# Patient Record
Sex: MALE | Race: BLACK OR AFRICAN AMERICAN | Employment: FULL TIME | URBAN - METROPOLITAN AREA
[De-identification: names, ages, dates, MRNs, and addresses within clinical notes are randomized per-mention and may not be internally consistent; named-entity substitution may affect disease eponyms.]

---

## 2017-08-17 ENCOUNTER — HOSPITAL ENCOUNTER (OUTPATIENT)
Dept: PHYSICAL THERAPY | Age: 57
Discharge: HOME | End: 2017-08-17
Payer: COMMERCIAL

## 2017-08-17 PROCEDURE — 97161 PT EVAL LOW COMPLEX 20 MIN: CPT

## 2017-08-17 PROCEDURE — 97110 THERAPEUTIC EXERCISES: CPT

## 2017-08-17 NOTE — PROGRESS NOTES
[facility]  [address], [address] [phone] fax    Plan of Care/ Statement of Necessity for Physical Therapy Services           Patient name: [facility] Start of Care: 2017   Referral source: Shaila Renee* : 1960    Medical Diagnosis: Left shoulder pain [M25.512]  Left arm pain [M79.602]   Onset Date:17    Treatment Diagnosis: s/p L distal biceps tendon repair   Prior Hospitalization: see medical history Provider#: [id number]   Medications: Verified on Patient summary List    Comorbidities: none per patient    Prior Level of Function: Works as [employer] Officer (). Plans to return to work next week. Functionally independent and active, enjoys working out frequently    The Plan of Care and following information is based on the information from the initial evaluation. Assessment/ key information: Patient is a 61 y. o.male presenting with Left shoulder pain [M25.512]  Left arm pain [M79.602]. Mr. Gayatri Abreu presents to initial PT evaluation ~ 7 weeks s/p distal biceps tendon repair (DOS:17) after injuring it falling off his deck and hitting it on the edge of his pool. He arrives to his visit today without brace in place, reporting he has not needed to use it. Advised him that per protocol he should continue to wear the brace, at least until MD clears him to discontinue use. He demonstrates full passive elbow extension, but is limited with end range elbow flexion and pronation. Pain is well controlled, although he does report some lateral epicondyle pain when keeping the elbow in a flexed position or with wrist extension activities. He is doing very well and is motivated to return to his active lifestyle and workout regimen. I feel he will need some cuing to slow down with daily activities for this reason to prevent reinjury to surgical site.   Patient will benefit from skilled PT services to address deficits and facilitate return to premorbid activity level and promote improved quality of life. Evaluation Complexity History LOW Complexity : Zero comorbidities / personal factors that will impact the outcome / POC; Examination LOW Complexity : 1-2 Standardized tests and measures addressing body structure, function, activity limitation and / or participation in recreation  ;Presentation MEDIUM Complexity : Evolving with changing characteristics  ; Clinical Decision Making MEDIUM Complexity : FOTO score of 26-74  Overall Complexity Rating: LOW   Problem List: pain affecting function, decrease ROM, decrease strength, edema affecting function, impaired gait/ balance, decrease ADL/ functional abilitiies, decrease activity tolerance, decrease flexibility/ joint mobility and decrease transfer abilities   Treatment Plan may include any combination of the following: Therapeutic exercise, Therapeutic activities, Neuromuscular re-education, Physical agent/modality, Gait/balance training, Manual therapy, Aquatic therapy, Patient education, Self Care training, Functional mobility training, Home safety training and Stair training  Patient / Family readiness to learn indicated by: asking questions, trying to perform skills and interest  Persons(s) to be included in education: patient (P)  Barriers to Learning/Limitations: None  Patient Goal (s): strength.   Patient Self Reported Health Status: excellent  Rehabilitation Potential: excellent  Short Term Goals: To be accomplished in 1 weeks:  1. Establish HEP for ROM & Strengthening. Long Term Goals: To be accomplished in 4 weeks:  1. Patient will be independent with HEP for ROM & Strengthening. Eval Status: na  2. Pt will increase L elbow ROM to 0-140 degrees to increase ease with ADLs. Eval Status:(0-120 deg)  4. Pt will increase FOTO score to 78 points to demonstrate improved functional lift & carry. Eval Status:FOTO: 64  5.  Pt will increase L elbow flexion/extension strength to grossly 5/5 in order to improve functional lift & carry. Eval Status:flexion: will address as cleared per protocol (9 weeks post op)    Frequency / Duration: Patient to be seen 2 times per week for 4 weeks. Patient/ Caregiver education and instruction: Diagnosis, prognosis, self care, activity modification and exercises   [x]  Plan of care has been reviewed with AURA Lechuga, PT 8/17/2017 8:42 AM    ________________________________________________________________________    I certify that the above Therapy Services are being furnished while the patient is under my care. I agree with the treatment plan and certify that this therapy is necessary.     Physician's Signature:____________________  Date:____________Time: _________    Please sign and return to [facility]  [address] [place], [address] [phone] fax

## 2017-08-17 NOTE — PROGRESS NOTES
PT DAILY TREATMENT NOTE     Patient Name: Chely Credit  Date:2017  : 1960  [x]  Patient  Verified  Payor: Bettina Machado / Plan: [place] [employer]  CAPITATED PT / Product Type: Commerical /    In time:805  Out time:835  Total Treatment Time (min): 30  Visit #: 1 of 8    Treatment Area: Left shoulder pain [M25.512]  Left arm pain [M79.602]    SUBJECTIVE  Pain Level (0-10 scale): 0  Any medication changes, allergies to medications, adverse drug reactions, diagnosis change, or new procedure performed?: [x] No    [] Yes (see summary sheet for update)  Subjective functional status/changes:   [x] See Eval form in paper chart     OBJECTIVE      22 min [x]Eval                  []Re-Eval         8 min Therapeutic Exercise:  [x] See flow sheet :HEP   Rationale: increase ROM, increase strength, improve coordination, improve balance and increase proprioception to improve the patients ability to perform ADLs. With   [] TE   [] TA   [] neuro   [] other: Patient Education: [x] Review HEP    [] Progressed/Changed HEP based on:   [] positioning   [] body mechanics   [] transfers   [] heat/ice application    [] other:           Pain Level (0-10 scale) post treatment: 0    ASSESSMENT:   [x]  See Evaluation          Goals:  Short Term Goals: To be accomplished in 1 weeks:  1. Establish HEP for ROM & Strengthening. Long Term Goals: To be accomplished in 4 weeks:  1. Patient will be independent with HEP for ROM & Strengthening. Eval Status: na  2. Pt will increase L elbow ROM to 0-140 degrees to increase ease with ADLs. Eval Status:(0-120 deg)  4. Pt will increase FOTO score to 78 points to demonstrate improved functional lift & carry. Eval Status:FOTO: 64  5. Pt will increase L elbow flexion/extension strength to grossly 5/5 in order to improve functional lift & carry.    Eval Status:flexion: will address as cleared per protocol (9 weeks post op)    PLAN      [x]  Continue plan of care           [facility] Freddy Rodriguez, PT 8/17/2017  8:52 AM

## 2017-08-18 ENCOUNTER — HOSPITAL ENCOUNTER (OUTPATIENT)
Dept: PHYSICAL THERAPY | Age: 57
Discharge: HOME | End: 2017-08-18
Payer: COMMERCIAL

## 2017-08-18 PROCEDURE — 97110 THERAPEUTIC EXERCISES: CPT

## 2017-08-18 NOTE — PROGRESS NOTES
PT DAILY TREATMENT NOTE     Patient Name: Mayra Evans  Date:2017  : 1960  [x]  Patient  Verified  Payor: Robyn Lopez / Plan: [place] [employer]  CAPITATED PT / Product Type: Commerical /    In time:835  Out time:915  Total Treatment Time (min): 40  Visit #: 2 of 8    Treatment Area: Left shoulder pain [M25.512]  Left arm pain [M79.602]    SUBJECTIVE  Pain Level (0-10 scale): 0  Any medication changes, allergies to medications, adverse drug reactions, diagnosis change, or new procedure performed?: [x] No    [] Yes (see summary sheet for update)  Subjective functional status/changes:   [] No changes reported  \"I'm doing great. \"    OBJECTIVE    Modality rationale: Patient declined   Min Type Additional Details    [] Estim:  []Unatt       []IFC  []Premod                        []Other:  []w/ice   []w/heat  Position:  Location:    [] Estim: []Att    []TENS instruct  []NMES                    []Other:  []w/US   []w/ice   []w/heat  Position:  Location:    []  Traction: [] Cervical       []Lumbar                       [] Prone          []Supine                       []Intermittent   []Continuous Lbs:  [] before manual  [] after manual    []  Ultrasound: []Continuous   [] Pulsed                           []1MHz   []3MHz W/cm2:  Location:    []  Iontophoresis with dexamethasone         Location: [] Take home patch   [] In clinic    []  Ice     []  heat  []  Ice massage  []  Laser   []  Anodyne Position:  Location:    []  Laser with stim  []  Other:  Position:  Location:    []  Vasopneumatic Device Pressure:       [] lo [] med [] hi   Temperature: [] lo [] med [] hi   [] Skin assessment post-treatment:  []intact []redness- no adverse reaction    []redness - adverse reaction:       40 min Therapeutic Exercise:  [x] See flow sheet :   Rationale: increase ROM, increase strength and improve coordination to improve the patients ability to perform ADLs.          With   [] TE   [] TA   [] neuro   [] other: Patient Education: [x] Review HEP    [] Progressed/Changed HEP based on:   [] positioning   [] body mechanics   [] transfers   [] heat/ice application    [] other:      Other Objective/Functional Measures: 0-138 deg AAROM L elbow     Pain Level (0-10 scale) post treatment: 0    ASSESSMENT/Changes in Function: Mr. Kiara Cedillo did well with initiation of exercises today. He did have some discomfort with web finger extension so held off. Patient will continue to benefit from skilled PT services to modify and progress therapeutic interventions, address functional mobility deficits, address ROM deficits, address strength deficits, analyze and address soft tissue restrictions, analyze and cue movement patterns, analyze and modify body mechanics/ergonomics, assess and modify postural abnormalities, address imbalance/dizziness and instruct in home and community integration to attain remaining goals. []  See Plan of Care  []  See progress note/recertification  []  See Discharge Summary         Progress towards goals / Updated goals:  Short Term Goals: To be accomplished in 1 weeks:  1. Establish HEP for ROM & Strengthening. issued     Long Term Goals: To be accomplished in 4 weeks:  1. Patient will be independent with HEP for ROM & Strengthening. Eval Status: na  2. Pt will increase L elbow ROM to 0-140 degrees to increase ease with ADLs. Eval Status:(0-120 deg)    0-138 deg with AAROM  4. Pt will increase FOTO score to 78 points to demonstrate improved functional lift & carry. Eval Status:FOTO: 64  5. Pt will increase L elbow flexion/extension strength to grossly 5/5 in order to improve functional lift & carry.                         Eval Status:flexion: will address as cleared per protocol (9 weeks post op)       PLAN  []  Upgrade activities as tolerated     [x]  Continue plan of care  []  Update interventions per flow sheet       []  Discharge due to:_  [] Other:_      Maldonado Landa, PT 8/18/2017  10:02 AM    Future Appointments  Date Time Provider Caroline Johnson   8/21/2017 11:00 AM Milbert Payment, PTA [facility]   8/24/2017 8:00 AM Milbert Payment, PTA [facility]   8/28/2017 10:00 AM Milbert Payment, PTA [facility]   8/30/2017 8:30 AM Milbert Payment, PTA [facility]   9/6/2017 8:00 AM [facility]   9/7/2017 8:30 AM Milbert Payment, PTA Demetrio Esparza

## 2017-08-21 ENCOUNTER — HOSPITAL ENCOUNTER (OUTPATIENT)
Dept: PHYSICAL THERAPY | Age: 57
Discharge: HOME | End: 2017-08-21
Payer: COMMERCIAL

## 2017-08-21 PROCEDURE — 97110 THERAPEUTIC EXERCISES: CPT

## 2017-08-21 NOTE — PROGRESS NOTES
PT DAILY TREATMENT NOTE     Patient Name: Zachary Huang  Date:2017  : 1960  [x]  Patient  Verified  Payor: Mimi Flowers / Plan: [employer]  CAPITATED PT / Product Type: Commerical /    In time:1100  Out time:1133  Total Treatment Time (min): 33  Visit #: 3 of 8    Treatment Area: Left shoulder pain [M25.512]  Left arm pain [M79.602]    SUBJECTIVE  Pain Level (0-10 scale): 0  Any medication changes, allergies to medications, adverse drug reactions, diagnosis change, or new procedure performed?: [x] No    [] Yes (see summary sheet for update)  Subjective functional status/changes:   [] No changes reported  \"No pain at all. \"    OBJECTIVE    Modality rationale: patient declined   Min Type Additional Details    [] Estim:  []Unatt       []IFC  []Premod                        []Other:  []w/ice   []w/heat  Position:  Location:    [] Estim: []Att    []TENS instruct  []NMES                    []Other:  []w/US   []w/ice   []w/heat  Position:  Location:    []  Traction: [] Cervical       []Lumbar                       [] Prone          []Supine                       []Intermittent   []Continuous Lbs:  [] before manual  [] after manual    []  Ultrasound: []Continuous   [] Pulsed                           []1MHz   []3MHz W/cm2:  Location:    []  Iontophoresis with dexamethasone         Location: [] Take home patch   [] In clinic    []  Ice     []  heat  []  Ice massage  []  Laser   []  Anodyne Position:  Location:    []  Laser with stim  []  Other:  Position:  Location:    []  Vasopneumatic Device Pressure:       [] lo [] med [] hi   Temperature: [] lo [] med [] hi   [] Skin assessment post-treatment:  []intact []redness- no adverse reaction    []redness - adverse reaction:     33 min Therapeutic Exercise:  [x] See flow sheet :   Rationale: increase ROM, increase strength, improve coordination, improve balance and increase proprioception to improve the patients ability to improve mobility and ADL performance With   [x] TE   [] TA   [] neuro   [] other: Patient Education: [x] Review HEP    [] Progressed/Changed HEP based on:   [x] positioning   [x] body mechanics   [] transfers   [] heat/ice application    [] other:      Other Objective/Functional Measures:      Pain Level (0-10 scale) post treatment: 0    ASSESSMENT/Changes in Function: Pt is progressing well, but needs some cuing to not over do things around the clinic. Limited and a little discomfort with pronation. Patient will continue to benefit from skilled PT services to modify and progress therapeutic interventions, address functional mobility deficits, address ROM deficits, address strength deficits, analyze and address soft tissue restrictions, analyze and cue movement patterns, analyze and modify body mechanics/ergonomics, assess and modify postural abnormalities, address imbalance/dizziness and instruct in home and community integration to attain remaining goals. [x]  See Plan of Care  []  See progress note/recertification  []  See Discharge Summary         Progress towards goals / Updated goals:  Short Term Goals: To be accomplished in 1 weeks:  1. Establish HEP for ROM & Strengthening. MET      Long Term Goals: To be accomplished in 4 weeks:  1. Patient will be independent with HEP for ROM & Strengthening. Eval Status: na   Reports compliance  2. Pt will increase L elbow ROM to 0-140 degrees to increase ease with ADLs. Eval Status:(0-120 deg)   0-138 deg with AAROM  4. Pt will increase FOTO score to 78 points to demonstrate improved functional lift & carry. Eval Status:FOTO: 64   Assess NV  5. Pt will increase L elbow flexion/extension strength to grossly 5/5 in order to improve functional lift & carry.    Eval Status:flexion: will address as cleared per protocol (9 weeks post op)   NA    PLAN  []  Upgrade activities as tolerated     [x]  Continue plan of care  []  Update interventions per flow sheet       []  Discharge due to:_  [] Other:_      Germain Cross PTA, CSCS 8/21/2017  11:42 AM    Future Appointments  Date Time Provider Caroline Crowleyi   8/24/2017 8:00 AM Germain Cross PTA [facility]   8/28/2017 10:00 AM Germain Cross PTA [facility]   8/30/2017 8:30 AM Germain Cross PTA [facility]   9/6/2017 8:00 AM William Hernandez PT [facility]   9/7/2017 8:30 AM AURA Marin

## 2017-08-24 ENCOUNTER — APPOINTMENT (OUTPATIENT)
Dept: PHYSICAL THERAPY | Age: 57
End: 2017-08-24
Payer: COMMERCIAL

## 2017-08-25 ENCOUNTER — HOSPITAL ENCOUNTER (OUTPATIENT)
Dept: PHYSICAL THERAPY | Age: 57
Discharge: HOME | End: 2017-08-25
Payer: COMMERCIAL

## 2017-08-25 PROCEDURE — 97110 THERAPEUTIC EXERCISES: CPT

## 2017-08-25 NOTE — PROGRESS NOTES
PT DAILY TREATMENT NOTE     Patient Name: Mayra Evans  Date:2017  : 1960  [x]  Patient  Verified  Payor: Robyn John / Plan: [employer]  CAPITATED PT / Product Type: Commerical /    In time:1200  Out time:1238  Total Treatment Time (min): 38  Visit #: 4 of 8    Treatment Area: Left shoulder pain [M25.512]  Left arm pain [M79.602]    SUBJECTIVE  Pain Level (0-10 scale): 0  Any medication changes, allergies to medications, adverse drug reactions, diagnosis change, or new procedure performed?: [x] No    [] Yes (see summary sheet for update)  Subjective functional status/changes:   [] No changes reported  \"No pain. \"    OBJECTIVE    Modality rationale: patient declined   Min Type Additional Details    [] Estim:  []Unatt       []IFC  []Premod                        []Other:  []w/ice   []w/heat  Position:  Location:    [] Estim: []Att    []TENS instruct  []NMES                    []Other:  []w/US   []w/ice   []w/heat  Position:  Location:    []  Traction: [] Cervical       []Lumbar                       [] Prone          []Supine                       []Intermittent   []Continuous Lbs:  [] before manual  [] after manual    []  Ultrasound: []Continuous   [] Pulsed                           []1MHz   []3MHz W/cm2:  Location:    []  Iontophoresis with dexamethasone         Location: [] Take home patch   [] In clinic    []  Ice     []  heat  []  Ice massage  []  Laser   []  Anodyne Position:  Location:    []  Laser with stim  []  Other:  Position:  Location:    []  Vasopneumatic Device Pressure:       [] lo [] med [] hi   Temperature: [] lo [] med [] hi   [] Skin assessment post-treatment:  []intact []redness- no adverse reaction    []redness - adverse reaction:     38 min Therapeutic Exercise:  [x] See flow sheet :   Rationale: increase ROM and increase strength to improve the patients ability to perform ADLs         With   [x] TE   [] TA   [] neuro   [] other: Patient Education: [x] Review HEP    [] Progressed/Changed HEP based on:   [x] positioning   [x] body mechanics   [] transfers   [] heat/ice application    [] other:      Other Objective/Functional Measures:      Pain Level (0-10 scale) post treatment: 0    ASSESSMENT/Changes in Function: Pt continues to progress well. Still needs cuing to not push himself too much. Continues to have discomfort with pronation. Patient will continue to benefit from skilled PT services to modify and progress therapeutic interventions, address functional mobility deficits, address ROM deficits, address strength deficits, analyze and address soft tissue restrictions, analyze and cue movement patterns, analyze and modify body mechanics/ergonomics, assess and modify postural abnormalities, address imbalance/dizziness and instruct in home and community integration to attain remaining goals. [x]  See Plan of Care  []  See progress note/recertification  []  See Discharge Summary         Progress towards goals / Updated goals:  Short Term Goals: To be accomplished in 1 weeks:  1. Establish HEP for ROM & Strengthening. MET      Long Term Goals: To be accomplished in 4 weeks:  1. Patient will be independent with HEP for ROM & Strengthening. Eval Status: na   Reports compliance  2. Pt will increase L elbow ROM to 0-140 degrees to increase ease with ADLs. Eval Status:(0-120 deg)   0-138 deg with AAROM  4. Pt will increase FOTO score to 78 points to demonstrate improved functional lift & carry. Eval Status:FOTO: 64   Assess NV  5. Pt will increase L elbow flexion/extension strength to grossly 5/5 in order to improve functional lift & carry. Eval Status:flexion: will address as cleared per protocol (9 weeks post op)   NA    PLAN  []  Upgrade activities as tolerated     [x]  Continue plan of care  []  Update interventions per flow sheet       []  Discharge due to:_  []  Other:_      Bandar Moran PTA, CSCS 8/25/2017  12:40 PM    No future appointments.

## 2017-08-28 ENCOUNTER — APPOINTMENT (OUTPATIENT)
Dept: PHYSICAL THERAPY | Age: 57
End: 2017-08-28
Payer: COMMERCIAL

## 2017-08-30 ENCOUNTER — APPOINTMENT (OUTPATIENT)
Dept: PHYSICAL THERAPY | Age: 57
End: 2017-08-30
Payer: COMMERCIAL

## 2017-08-31 ENCOUNTER — HOSPITAL ENCOUNTER (OUTPATIENT)
Dept: PHYSICAL THERAPY | Age: 57
Discharge: HOME | End: 2017-08-31
Payer: COMMERCIAL

## 2017-08-31 PROCEDURE — 97112 NEUROMUSCULAR REEDUCATION: CPT

## 2017-08-31 PROCEDURE — 97110 THERAPEUTIC EXERCISES: CPT

## 2017-08-31 NOTE — PROGRESS NOTES
PT DAILY TREATMENT NOTE     Patient Name: Regis Veras  Date:2017  : 1960  [x]  Patient  Verified  Payor: Jeannine Cooepr / Plan: [place] [employer]  CAPITATED PT / Product Type: Commerical /    In time:730  Out time:818  Total Treatment Time (min): 48  Visit #: 5 of 8    Treatment Area: Left shoulder pain [M25.512]  Left arm pain [M79.602]    SUBJECTIVE  Pain Level (0-10 scale): 0  Any medication changes, allergies to medications, adverse drug reactions, diagnosis change, or new procedure performed?: [x] No    [] Yes (see summary sheet for update)  Subjective functional status/changes:   [] No changes reported  \"No pain. \"    OBJECTIVE    Modality rationale: patient declined   Min Type Additional Details    [] Estim:  []Unatt       []IFC  []Premod                        []Other:  []w/ice   []w/heat  Position:  Location:    [] Estim: []Att    []TENS instruct  []NMES                    []Other:  []w/US   []w/ice   []w/heat  Position:  Location:    []  Traction: [] Cervical       []Lumbar                       [] Prone          []Supine                       []Intermittent   []Continuous Lbs:  [] before manual  [] after manual    []  Ultrasound: []Continuous   [] Pulsed                           []1MHz   []3MHz W/cm2:  Location:    []  Iontophoresis with dexamethasone         Location: [] Take home patch   [] In clinic    []  Ice     []  heat  []  Ice massage  []  Laser   []  Anodyne Position:  Location:    []  Laser with stim  []  Other:  Position:  Location:    []  Vasopneumatic Device Pressure:       [] lo [] med [] hi   Temperature: [] lo [] med [] hi   [] Skin assessment post-treatment:  []intact []redness- no adverse reaction    []redness - adverse reaction:     38 min Therapeutic Exercise:  [x] See flow sheet :   Rationale: increase ROM and increase strength to improve the patients ability to perform ADLs    10 min Neuromuscular Re-education:  [x]  See flow sheet : functional reaching activities Rationale: increase ROM, increase strength, improve coordination, improve balance and increase proprioception  to improve the patients ability to improve functional mobility and reaching          With   [x] TE   [] TA   [x] neuro   [] other: Patient Education: [x] Review HEP    [] Progressed/Changed HEP based on:   [x] positioning   [x] body mechanics   [] transfers   [] heat/ice application    [] other:      Other Objective/Functional Measures:    FOTO 50    Pain Level (0-10 scale) post treatment: 0    ASSESSMENT/Changes in Function: Pt continues to progress well with his mobility and motion. Still reports tightness and difficulties with pronation. Patient will continue to benefit from skilled PT services to modify and progress therapeutic interventions, address functional mobility deficits, address ROM deficits, address strength deficits, analyze and address soft tissue restrictions, analyze and cue movement patterns, analyze and modify body mechanics/ergonomics, assess and modify postural abnormalities, address imbalance/dizziness and instruct in home and community integration to attain remaining goals. [x]  See Plan of Care  []  See progress note/recertification  []  See Discharge Summary         Progress towards goals / Updated goals:  Short Term Goals: To be accomplished in 1 weeks:  1. Establish HEP for ROM & Strengthening. MET      Long Term Goals: To be accomplished in 4 weeks:  1. Patient will be independent with HEP for ROM & Strengthening. Eval Status: na   Reports compliance  2. Pt will increase L elbow ROM to 0-140 degrees to increase ease with ADLs. Eval Status:(0-120 deg)   0-138 deg with AAROM  4. Pt will increase FOTO score to 78 points to demonstrate improved functional lift & carry. Eval Status:FOTO: 64   50  5. Pt will increase L elbow flexion/extension strength to grossly 5/5 in order to improve functional lift & carry.    Eval Status:flexion: will address as cleared per protocol (9 weeks post op)   NA    PLAN  []  Upgrade activities as tolerated     [x]  Continue plan of care  []  Update interventions per flow sheet       []  Discharge due to:_  []  Other:_      Roger Gillespie PTA, CSCS 8/31/2017  8:22 AM    Future Appointments  Date Time Provider Caroline Johnson   9/1/2017 8:00 AM Roger Gillespie PTA [facility]   9/6/2017 7:30 AM Roger Gillespie PTA [facility]   9/8/2017 8:00 AM Roger Gillespie PTA [facility]   9/13/2017 7:30 AM Roger Gillespie PTA [facility]   9/15/2017 8:00 AM Roger Gillespie PTA [facility]   9/18/2017 7:30 AM Roger Gillespie PTA [facility]   9/20/2017 7:30 AM Roger Gillespie PTA [facility]   9/25/2017 7:30 AM Lupe Rust, PT [facility]   9/28/2017 7:30 AM Roger Gillespie PTA [facility]

## 2017-09-01 ENCOUNTER — HOSPITAL ENCOUNTER (OUTPATIENT)
Dept: PHYSICAL THERAPY | Age: 57
Discharge: HOME | End: 2017-09-01
Payer: COMMERCIAL

## 2017-09-01 PROCEDURE — 97110 THERAPEUTIC EXERCISES: CPT

## 2017-09-01 PROCEDURE — 97112 NEUROMUSCULAR REEDUCATION: CPT

## 2017-09-01 NOTE — PROGRESS NOTES
PT DAILY TREATMENT NOTE     Patient Name: Glendy Downs  Date:2017  : 1960  [x]  Patient  Verified  Payor: Norma Mae / Plan: [employer]  CAPITATED PT / Product Type: Commerical /    In time:800  Out time:832  Total Treatment Time (min): 32  Visit #: 6 of 8    Treatment Area: Left shoulder pain [M25.512]  Left arm pain [M79.602]    SUBJECTIVE  Pain Level (0-10 scale): 0  Any medication changes, allergies to medications, adverse drug reactions, diagnosis change, or new procedure performed?: [x] No    [] Yes (see summary sheet for update)  Subjective functional status/changes:   [] No changes reported  \"No pain. \"    OBJECTIVE    Modality rationale: patient declined   Min Type Additional Details    [] Estim:  []Unatt       []IFC  []Premod                        []Other:  []w/ice   []w/heat  Position:  Location:    [] Estim: []Att    []TENS instruct  []NMES                    []Other:  []w/US   []w/ice   []w/heat  Position:  Location:    []  Traction: [] Cervical       []Lumbar                       [] Prone          []Supine                       []Intermittent   []Continuous Lbs:  [] before manual  [] after manual    []  Ultrasound: []Continuous   [] Pulsed                           []1MHz   []3MHz W/cm2:  Location:    []  Iontophoresis with dexamethasone         Location: [] Take home patch   [] In clinic    []  Ice     []  heat  []  Ice massage  []  Laser   []  Anodyne Position:  Location:    []  Laser with stim  []  Other:  Position:  Location:    []  Vasopneumatic Device Pressure:       [] lo [] med [] hi   Temperature: [] lo [] med [] hi   [] Skin assessment post-treatment:  []intact []redness- no adverse reaction    []redness - adverse reaction:     24 min Therapeutic Exercise:  [x] See flow sheet :   Rationale: increase ROM and increase strength to improve the patients ability to perform ADLs    8 min Neuromuscular Re-education:  [x]  See flow sheet : functional reaching activities Rationale: increase ROM, increase strength, improve coordination, improve balance and increase proprioception  to improve the patients ability to improve functional mobility and reaching        With   [x] TE   [] TA   [x] neuro   [] other: Patient Education: [x] Review HEP    [] Progressed/Changed HEP based on:   [x] positioning   [x] body mechanics   [] transfers   [] heat/ice application    [] other:      Other Objective/Functional Measures:      Pain Level (0-10 scale) post treatment: 0    ASSESSMENT/Changes in Function: Pt is progressing well. Initiated strengthening per protocol. Kept exercises within pain free range to which pt did well. Steadily improving with his motion, bother flexion/extension and supination/pronation. Still has some tightness and discomfort with pronation. Patient will continue to benefit from skilled PT services to modify and progress therapeutic interventions, address functional mobility deficits, address ROM deficits, address strength deficits, analyze and address soft tissue restrictions, analyze and cue movement patterns, analyze and modify body mechanics/ergonomics, assess and modify postural abnormalities, address imbalance/dizziness and instruct in home and community integration to attain remaining goals. [x]  See Plan of Care  []  See progress note/recertification  []  See Discharge Summary         Progress towards goals / Updated goals:  Short Term Goals: To be accomplished in 1 weeks:  1. Establish HEP for ROM & Strengthening. MET      Long Term Goals: To be accomplished in 4 weeks:  1. Patient will be independent with HEP for ROM & Strengthening. Eval Status: na   Reports compliance  2. Pt will increase L elbow ROM to 0-140 degrees to increase ease with ADLs. Eval Status:(0-120 deg)   0-138 deg  4. Pt will increase FOTO score to 78 points to demonstrate improved functional lift & carry. Eval Status:FOTO: 64   50  5.  Pt will increase L elbow flexion/extension strength to grossly 5/5 in order to improve functional lift & carry.    Eval Status:flexion: will address as cleared per protocol (9 weeks post op)   Initiated strengthening activities per protocol    PLAN  []  Upgrade activities as tolerated     [x]  Continue plan of care  []  Update interventions per flow sheet       []  Discharge due to:_  []  Other:_      Simone Harrison PTA, CSCS 9/1/2017  8:37 AM    Future Appointments  Date Time Provider Caroline Johnson   9/6/2017 7:30 AM Simone Harrison PTA [facility]   9/8/2017 8:00 AM Simone Harrison PTA [facility]   9/13/2017 7:30 AM Simone Harrison PTA [facility]   9/15/2017 8:00 AM Simone Harrison PTA [facility]   9/18/2017 7:30 AM Simone Harrison PTA [facility]   9/20/2017 7:30 AM Simone Harrison, AURA [facility]   9/25/2017 7:30 AM Anand Scott, PT [facility]   9/28/2017 7:30 AM Simone Harrison, AURA [facility]

## 2017-09-05 ENCOUNTER — APPOINTMENT (OUTPATIENT)
Dept: PHYSICAL THERAPY | Age: 57
End: 2017-09-05
Payer: COMMERCIAL

## 2017-09-06 ENCOUNTER — HOSPITAL ENCOUNTER (OUTPATIENT)
Dept: PHYSICAL THERAPY | Age: 57
Discharge: HOME | End: 2017-09-06
Payer: COMMERCIAL

## 2017-09-06 PROCEDURE — 97112 NEUROMUSCULAR REEDUCATION: CPT

## 2017-09-06 PROCEDURE — 97110 THERAPEUTIC EXERCISES: CPT

## 2017-09-06 NOTE — PROGRESS NOTES
PT DAILY TREATMENT NOTE     Patient Name: Sonali Branch  Date:2017  : 1960  [x]  Patient  Verified  Payor: Rupinder Villa / Plan: [employer]  CAPITATED PT / Product Type: Commerical /    In time:733  Out time:806  Total Treatment Time (min): 33  Visit #: 7 of 8    Treatment Area: Left shoulder pain [M25.512]  Left arm pain [M79.602]    SUBJECTIVE  Pain Level (0-10 scale): 0  Any medication changes, allergies to medications, adverse drug reactions, diagnosis change, or new procedure performed?: [x] No    [] Yes (see summary sheet for update)  Subjective functional status/changes:   [] No changes reported  \"No pain. \"    OBJECTIVE    Modality rationale: patient declined   Min Type Additional Details    [] Estim:  []Unatt       []IFC  []Premod                        []Other:  []w/ice   []w/heat  Position:  Location:    [] Estim: []Att    []TENS instruct  []NMES                    []Other:  []w/US   []w/ice   []w/heat  Position:  Location:    []  Traction: [] Cervical       []Lumbar                       [] Prone          []Supine                       []Intermittent   []Continuous Lbs:  [] before manual  [] after manual    []  Ultrasound: []Continuous   [] Pulsed                           []1MHz   []3MHz W/cm2:  Location:    []  Iontophoresis with dexamethasone         Location: [] Take home patch   [] In clinic    []  Ice     []  heat  []  Ice massage  []  Laser   []  Anodyne Position:  Location:    []  Laser with stim  []  Other:  Position:  Location:    []  Vasopneumatic Device Pressure:       [] lo [] med [] hi   Temperature: [] lo [] med [] hi   [] Skin assessment post-treatment:  []intact []redness- no adverse reaction    []redness - adverse reaction:     10 min Therapeutic Exercise:  [x] See flow sheet :   Rationale: increase ROM and increase strength to improve the patients ability to perform ADLs    23 min Neuromuscular Re-education:  [x]  See flow sheet : functional reaching activities Rationale: increase ROM, increase strength, improve coordination, improve balance and increase proprioception  to improve the patients ability to improve functional mobility and reaching         With   [x] TE   [] TA   [x] neuro   [] other: Patient Education: [x] Review HEP    [] Progressed/Changed HEP based on:   [x] positioning   [x] body mechanics   [] transfers   [] heat/ice application    [] other:      Other Objective/Functional Measures:      Pain Level (0-10 scale) post treatment: 0    ASSESSMENT/Changes in Function: Pt shows steady improvement with motion and functional mobility. Continues to have some discomfort with pronation. Patient will continue to benefit from skilled PT services to modify and progress therapeutic interventions, address functional mobility deficits, address ROM deficits, address strength deficits, analyze and address soft tissue restrictions, analyze and cue movement patterns, analyze and modify body mechanics/ergonomics, assess and modify postural abnormalities, address imbalance/dizziness and instruct in home and community integration to attain remaining goals. [x]  See Plan of Care  []  See progress note/recertification  []  See Discharge Summary         Progress towards goals / Updated goals:  Short Term Goals: To be accomplished in 1 weeks:  1. Establish HEP for ROM & Strengthening. MET      Long Term Goals: To be accomplished in 4 weeks:  1. Patient will be independent with HEP for ROM & Strengthening. Eval Status: na   Reports compliance  2. Pt will increase L elbow ROM to 0-140 degrees to increase ease with ADLs. Eval Status:(0-120 deg)   0-138 deg  4. Pt will increase FOTO score to 78 points to demonstrate improved functional lift & carry. Eval Status:FOTO: 64   50  5. Pt will increase L elbow flexion/extension strength to grossly 5/5 in order to improve functional lift & carry.    Eval Status:flexion: will address as cleared per protocol (9 weeks post op)   Strengthening activities progressing per protocol    PLAN  []  Upgrade activities as tolerated     [x]  Continue plan of care  []  Update interventions per flow sheet       []  Discharge due to:_  []  Other:_      Cheri Ewing, PTA, CSCS 9/6/2017  8:09 AM    Future Appointments  Date Time Provider Caroline Johnson   9/8/2017 8:00 AM Mary Jojose Son, PTA [facility]   9/13/2017 7:30 AM Mary Jojose Son, PTA [facility]   9/15/2017 8:00 AM MaryJ ojose Son, PTA [facility]   9/18/2017 7:30 AM Mary Jojose Son, PTA [facility]   9/20/2017 7:30 AM Mary Jojose Son, PTA [facility]   9/25/2017 7:30 AM Annabelle Slice, PT [facility]   9/28/2017 7:30 AM Mary Jojose Son, PTA [facility]

## 2017-09-07 ENCOUNTER — APPOINTMENT (OUTPATIENT)
Dept: PHYSICAL THERAPY | Age: 57
End: 2017-09-07
Payer: COMMERCIAL

## 2017-09-08 ENCOUNTER — HOSPITAL ENCOUNTER (OUTPATIENT)
Dept: PHYSICAL THERAPY | Age: 57
Discharge: HOME | End: 2017-09-08
Payer: COMMERCIAL

## 2017-09-08 PROCEDURE — 97112 NEUROMUSCULAR REEDUCATION: CPT

## 2017-09-08 PROCEDURE — 97110 THERAPEUTIC EXERCISES: CPT

## 2017-09-08 NOTE — PROGRESS NOTES
PT DAILY TREATMENT NOTE     Patient Name: Magalie Back  Date:2017  : 1960  [x]  Patient  Verified  Payor: Kellen Corona / Plan: [employer]  CAPITATED PT / Product Type: Commerical /    In time:800  Out time:834  Total Treatment Time (min): 34  Visit #: 8 of 8    Treatment Area: Left shoulder pain [M25.512]  Left arm pain [M79.602]    SUBJECTIVE  Pain Level (0-10 scale): 0  Any medication changes, allergies to medications, adverse drug reactions, diagnosis change, or new procedure performed?: [x] No    [] Yes (see summary sheet for update)  Subjective functional status/changes:   [] No changes reported  \"No pain. \"    OBJECTIVE    Modality rationale: patient declined   Min Type Additional Details    [] Estim:  []Unatt       []IFC  []Premod                        []Other:  []w/ice   []w/heat  Position:  Location:    [] Estim: []Att    []TENS instruct  []NMES                    []Other:  []w/US   []w/ice   []w/heat  Position:  Location:    []  Traction: [] Cervical       []Lumbar                       [] Prone          []Supine                       []Intermittent   []Continuous Lbs:  [] before manual  [] after manual    []  Ultrasound: []Continuous   [] Pulsed                           []1MHz   []3MHz W/cm2:  Location:    []  Iontophoresis with dexamethasone         Location: [] Take home patch   [] In clinic    []  Ice     []  heat  []  Ice massage  []  Laser   []  Anodyne Position:  Location:    []  Laser with stim  []  Other:  Position:  Location:    []  Vasopneumatic Device Pressure:       [] lo [] med [] hi   Temperature: [] lo [] med [] hi   [] Skin assessment post-treatment:  []intact []redness- no adverse reaction    []redness - adverse reaction:     10 min Therapeutic Exercise:  [x] See flow sheet :   Rationale: increase ROM and increase strength to improve the patients ability to perform ADLs    24 min Neuromuscular Re-education:  [x]  See flow sheet : functional reaching activities Rationale: increase ROM, increase strength, improve coordination, improve balance and increase proprioception  to improve the patients ability to improve functional mobility and reaching          With   [x] TE   [] TA   [x] neuro   [] other: Patient Education: [x] Review HEP    [] Progressed/Changed HEP based on:   [x] positioning   [x] body mechanics   [] transfers   [] heat/ice application    [] other:      Other Objective/Functional Measures:   FOTO 64  AROM L elbow 0-142 deg     Pain Level (0-10 scale) post treatment: 0    ASSESSMENT/Changes in Function: Mr. Gayatri Abreu has been a pleasure to treat and reports 80% improvement since beginning therapy. Pt reports ease with most activities such as opening doors and jars. He demonstrates improved motion and strength, but still lacking full strength. Hasn't attempted any lifting of objects at this point due to just beginning strengthening. We will continue with therapy to address his remaining functional deficits. Patient will continue to benefit from skilled PT services to modify and progress therapeutic interventions, address functional mobility deficits, address ROM deficits, address strength deficits, analyze and address soft tissue restrictions, analyze and cue movement patterns, analyze and modify body mechanics/ergonomics, assess and modify postural abnormalities, address imbalance/dizziness and instruct in home and community integration to attain remaining goals. [x]  See Plan of Care  []  See progress note/recertification  []  See Discharge Summary         Progress towards goals / Updated goals:  Short Term Goals: To be accomplished in 1 weeks:  1. Establish HEP for ROM & Strengthening. MET      Long Term Goals: To be accomplished in 4 weeks:  1. Patient will be independent with HEP for ROM & Strengthening. Eval Status: na   PROGRESSING; Reports compliance, will update as we progress  2.  Pt will increase L elbow ROM to 0-140 degrees to increase ease with ADLs. Eval Status:(0-120 deg)   MET; 0-142 deg  4. Pt will increase FOTO score to 78 points to demonstrate improved functional lift & carry. Eval Status:FOTO: 64   NOT MET; 64  5. Pt will increase L elbow flexion/extension strength to grossly 5/5 in order to improve functional lift & carry. Eval Status:flexion: will address as cleared per protocol (9 weeks post op)   PROGRESSING; 4+/5    Functional Gains: ADLs, supination/pronation, ROM with flexion/extension, opening doors, opening jars  Functional Deficits: strength, picking up heavy objects, repetitive motion   % improvement: 80%  Pain   Average: 2/10       Best: 0/10     Worst: 5/10  Patient Goal: \"get back to be able to workout and lift weights. \"    PLAN  []  Upgrade activities as tolerated     [x]  Continue plan of care  []  Update interventions per flow sheet       []  Discharge due to:_  []  Other:_      Janelle Brito PTA, CSCS 9/8/2017  8:45 AM    Future Appointments  Date Time Provider Caroline Johnson   9/13/2017 7:30 AM Devinre Ast, PTA [facility]   9/15/2017 8:00 AM Devinre Ast, PTA [facility]   9/18/2017 7:30 AM Devinre Ast, PTA [facility]   9/20/2017 7:30 AM Janelle Ast, PTA [facility]   9/25/2017 7:30 AM Arely Arzate PT [facility]   9/28/2017 7:30 AM Devinre Ast, PTA [facility]

## 2017-09-12 NOTE — PROGRESS NOTES
[facility]  [address], [address] [phone] fax    Physician Update  [x] Progress Note  [] Discharge Summary    Patient name: Alex Johnson Start of Care: 2017   Referral source: Natalie Awad* : 1960                          Medical Diagnosis: Left shoulder pain [M25.512]  Left arm pain [M79.602] Onset Date:17                          Treatment Diagnosis: s/p L distal biceps tendon repair   Prior Hospitalization: see medical history Provider#: [id number]   Medications: Verified on Patient summary List    Comorbidities: none per patient    Prior Level of Function: 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 Functionally independent and active, enjoys working out frequently       Visits from Start of Care: 8    Missed Visits: 0    S  Progress towards Goals:   Short Term Goals: To be accomplished in 1 weeks:  1. Establish HEP for ROM & Strengthening. MET      Long Term Goals: To be accomplished in 4 weeks:  1. Patient will be independent with HEP for ROM & Strengthening. Eval Status: na                        PROGRESSING; Reports compliance, will update as we progress  2. Pt will increase L elbow ROM to 0-140 degrees to increase ease with ADLs. Eval Status:(0-120 deg)                        MET; 0-142 deg  4. Pt will increase FOTO score to 78 points to demonstrate improved functional lift & carry. Eval Status:FOTO: 64                        NOT MET; 64  5. Pt will increase L elbow flexion/extension strength to grossly 5/5 in order to improve functional lift & carry.                         Eval Status:flexion: will address as cleared per protocol (9 weeks post op)                        PROGRESSING; 4+/5     Functional Gains: ADLs, supination/pronation, ROM with flexion/extension, opening doors, opening jars  Functional Deficits: strength, picking up heavy objects, repetitive motion   % improvement: 80%  Pain   Average: 2/10                            Best: 0/10                          Worst: 5/10  Patient Goal: \"get back to be able to workout and lift weights. \"     Goals: to be achieved in 4 weeks:  1. Patient will be independent with HEP for ROM & Strengthening. PN status: Reports compliance, will update as we progress  2. Pt will increase FOTO score to 78 points to demonstrate improved functional lift & carry. PN status:  64  3. Pt will increase L elbow flexion/extension strength to grossly 5/5 in order to improve functional lift & carry. PN status:  4+/5       ASSESSMENT/RECOMMENDATIONS:  Mr. Sonny Mckenna has done remarkably well with rehab thus far and continues to report 0/10 pain with all activities. Per protocol, he has been cleared to progress to strengthening activities and hs tolerated this well. We will continue to improve elbow strength with progression of functional training activities as able.    [x]Continue therapy per initial plan/protocol at a frequency of  2 x per week for 4 weeks  []Continue therapy with the following recommended changes:_____________________      _____________________________________________________________________  []Discontinue therapy progressing towards or have reached established goals  []Discontinue therapy due to lack of appreciable progress towards goals  []Discontinue therapy due to lack of attendance or compliance  []Await Physician's recommendations/decisions regarding therapy  []Other:________________________________________________________________    Thank you for this referral.   Tk Molina, PT 9/12/2017 9:24 AM  NOTE TO PHYSICIAN:  Majo Miles 172   FAX TO [facility]: ([phone]   If you are unable to process this request in 24 hours please contact our office: [phone]    []  I have read the above report and request that my patient continue as recommended. []  I have read the above report and request that my patient continue therapy with the following changes/special instructions:________________________________________  []I have read the above report and request that my patient be discharged from therapy.     Physicians signature: ______________________________Date: _____Time:_______

## 2017-09-13 ENCOUNTER — HOSPITAL ENCOUNTER (OUTPATIENT)
Dept: PHYSICAL THERAPY | Age: 57
Discharge: HOME | End: 2017-09-13
Payer: COMMERCIAL

## 2017-09-13 PROCEDURE — 97112 NEUROMUSCULAR REEDUCATION: CPT

## 2017-09-13 PROCEDURE — 97110 THERAPEUTIC EXERCISES: CPT

## 2017-09-13 NOTE — PROGRESS NOTES
PT DAILY TREATMENT NOTE     Patient Name: August Mena  Date:2017  : 1960  [x]  Patient  Verified  Payor: Lazaro Fleming / [facility] Product Type: Commerical /    In time:730  Out time:811  Total Treatment Time (min): 41  Visit #: 1 of 8    Treatment Area: Left shoulder pain [M25.512]  Left arm pain [M79.602]    SUBJECTIVE  Pain Level (0-10 scale): 0  Any medication changes, allergies to medications, adverse drug reactions, diagnosis change, or new procedure performed?: [x] No    [] Yes (see summary sheet for update)  Subjective functional status/changes:   [] No changes reported  \"I'm doing good. \"    OBJECTIVE      11 min Therapeutic Exercise:  [x] See flow sheet :   Rationale: increase ROM, increase strength and improve coordination to improve the patients ability to perform ADLs. 30 min Neuromuscular Re-education:  [x]  See flow sheet :   Rationale: increase ROM, increase strength, improve coordination, improve balance and increase proprioception  to improve the patients ability to reach/lift. With   [] TE   [] TA   [] neuro   [] other: Patient Education: [x] Review HEP    [] Progressed/Changed HEP based on:   [] positioning   [] body mechanics   [] transfers   [] heat/ice application    [] other:      Other Objective/Functional Measures:      Pain Level (0-10 scale) post treatment: 0    ASSESSMENT/Changes in Function: progressed some exercises today to work on eccentric control, did well with pain control. Had some irritation with wrist extension with resistance, so held.     Patient will continue to benefit from skilled PT services to modify and progress therapeutic interventions, address functional mobility deficits, address ROM deficits, address strength deficits, analyze and address soft tissue restrictions, analyze and cue movement patterns, analyze and modify body mechanics/ergonomics, assess and modify postural abnormalities, address imbalance/dizziness and instruct in home and community integration to attain remaining goals. []  See Plan of Care  []  See progress note/recertification  []  See Discharge Summary    Goals: to be achieved in 4 weeks:  1. Patient will be independent with HEP for ROM & Strengthening.                        PN status: Reports compliance, will update as we progress  2. Pt will increase FOTO score to 78 points to demonstrate improved functional lift & carry.                         PN status:  64  3.  Pt will increase L elbow flexion/extension strength to grossly 5/5 in order to improve functional lift & carry.                        PN status:  4+/5       PLAN  []  Upgrade activities as tolerated     [x]  Continue plan of care  []  Update interventions per flow sheet       []  Discharge due to:_  []  Other:_      Jamaica Avendaño, PT 9/13/2017  7:31 AM    Future Appointments  Date Time Provider Caroline Johnson   9/15/2017 8:00 AM Alean Lennox, PTA [facility]   9/18/2017 7:30 AM Alean Lennox, PTA [facility]   9/20/2017 7:30 AM Alean Lennox, PTA [facility]   9/25/2017 7:30 AM Jamaica Avendaño, PT [facility]   9/28/2017 7:30 AM Alean Lennox, PTA Maryal[facility] Mins

## 2017-09-15 ENCOUNTER — HOSPITAL ENCOUNTER (OUTPATIENT)
Dept: PHYSICAL THERAPY | Age: 57
Discharge: HOME | End: 2017-09-15
Payer: COMMERCIAL

## 2017-09-15 PROCEDURE — 97112 NEUROMUSCULAR REEDUCATION: CPT

## 2017-09-15 PROCEDURE — 97110 THERAPEUTIC EXERCISES: CPT

## 2017-09-15 NOTE — PROGRESS NOTES
PT DAILY TREATMENT NOTE     Patient Name: Magalie Back  Date:9/15/2017  : 1960  [x]  Patient  Verified  Payor: Kellen Corona / Plan: [employer]  [facility] / Product Type: Commerical /    In time:800  Out time:830  Total Treatment Time (min): 30  Visit #: 2 of 8    Treatment Area: Left shoulder pain [M25.512]  Left arm pain [M79.602]    SUBJECTIVE  Pain Level (0-10 scale): 0  Any medication changes, allergies to medications, adverse drug reactions, diagnosis change, or new procedure performed?: [x] No    [] Yes (see summary sheet for update)  Subjective functional status/changes:   [] No changes reported  \"No pain. \"    OBJECTIVE    Modality rationale: patient declined   Min Type Additional Details    [] Estim:  []Unatt       []IFC  []Premod                        []Other:  []w/ice   []w/heat  Position:  Location:    [] Estim: []Att    []TENS instruct  []NMES                    []Other:  []w/US   []w/ice   []w/heat  Position:  Location:    []  Traction: [] Cervical       []Lumbar                       [] Prone          []Supine                       []Intermittent   []Continuous Lbs:  [] before manual  [] after manual    []  Ultrasound: []Continuous   [] Pulsed                           []1MHz   []3MHz W/cm2:  Location:    []  Iontophoresis with dexamethasone         Location: [] Take home patch   [] In clinic    []  Ice     []  heat  []  Ice massage  []  Laser   []  Anodyne Position:  Location:    []  Laser with stim  []  Other:  Position:  Location:    []  Vasopneumatic Device Pressure:       [] lo [] med [] hi   Temperature: [] lo [] med [] hi   [] Skin assessment post-treatment:  []intact []redness- no adverse reaction    []redness - adverse reaction:     10 min Therapeutic Exercise:  [x] See flow sheet :   Rationale: increase ROM and increase strength to improve the patients ability to perform ADLs    20 min Neuromuscular Re-education:  [x]  See flow sheet : functional reaching activities Rationale: increase ROM, increase strength, improve coordination, improve balance and increase proprioception  to improve the patients ability to improve mobility, reaching, lifting, and carrying. With   [x] TE   [] TA   [x] neuro   [] other: Patient Education: [x] Review HEP    [] Progressed/Changed HEP based on:   [x] positioning   [x] body mechanics   [] transfers   [] heat/ice application    [] other:      Other Objective/Functional Measures:      Pain Level (0-10 scale) post treatment: 0    ASSESSMENT/Changes in Function: Pt is progressing well. Demonstrates good eccentric control with all exercises. Needs some cues to not over work himself and push himself too much. Patient will continue to benefit from skilled PT services to modify and progress therapeutic interventions, address functional mobility deficits, address ROM deficits, address strength deficits, analyze and address soft tissue restrictions, analyze and cue movement patterns, analyze and modify body mechanics/ergonomics, assess and modify postural abnormalities, address imbalance/dizziness and instruct in home and community integration to attain remaining goals. [x]  See Plan of Care  []  See progress note/recertification  []  See Discharge Summary         Progress towards goals / Updated goals:  1. Patient will be independent with HEP for ROM & Strengthening. PN status: Reports compliance, will update as we progress   Continued compliance  2. Pt will increase FOTO score to 78 points to demonstrate improved functional lift & carry. PN status:  64   Assess at 4th visit  3. Pt will increase L elbow flexion/extension strength to grossly 5/5 in order to improve functional lift & carry.    PN status:  4+/5   Continued improvement    PLAN  []  Upgrade activities as tolerated     [x]  Continue plan of care  []  Update interventions per flow sheet       []  Discharge due to:_  []  Other:_      Ida Celestin, AURA, TY 9/15/2017  8:34 AM    Future Appointments  Date Time Provider Caroline Johnson   9/18/2017 7:30 AM Judy Grider PTA [facility]   9/20/2017 7:30 AM Judy Grider PTA [facility]   9/25/2017 7:30 AM Alex Woodruff PT [facility]   9/28/2017 7:30 AM Judy Grider PTA [facility]

## 2017-09-18 ENCOUNTER — HOSPITAL ENCOUNTER (OUTPATIENT)
Dept: PHYSICAL THERAPY | Age: 57
Discharge: HOME | End: 2017-09-18
Payer: COMMERCIAL

## 2017-09-18 PROCEDURE — 97112 NEUROMUSCULAR REEDUCATION: CPT

## 2017-09-18 PROCEDURE — 97110 THERAPEUTIC EXERCISES: CPT

## 2017-09-18 NOTE — PROGRESS NOTES
PT DAILY TREATMENT NOTE     Patient Name: Tre Lopez  Date:2017  : 1960  [x]  Patient  Verified  Payor: Bakari Agarwal / Plan: [employer]  [facility] / Product Type: Commerical /    In time:732  Out time:808  Total Treatment Time (min): 36  Visit #: 3 of 8    Treatment Area: Left shoulder pain [M25.512]  Left arm pain [M79.602]    SUBJECTIVE  Pain Level (0-10 scale): 0  Any medication changes, allergies to medications, adverse drug reactions, diagnosis change, or new procedure performed?: [x] No    [] Yes (see summary sheet for update)  Subjective functional status/changes:   [] No changes reported  \"No pain. \"    OBJECTIVE    Modality rationale: patient declined   Min Type Additional Details    [] Estim:  []Unatt       []IFC  []Premod                        []Other:  []w/ice   []w/heat  Position:  Location:    [] Estim: []Att    []TENS instruct  []NMES                    []Other:  []w/US   []w/ice   []w/heat  Position:  Location:    []  Traction: [] Cervical       []Lumbar                       [] Prone          []Supine                       []Intermittent   []Continuous Lbs:  [] before manual  [] after manual    []  Ultrasound: []Continuous   [] Pulsed                           []1MHz   []3MHz W/cm2:  Location:    []  Iontophoresis with dexamethasone         Location: [] Take home patch   [] In clinic    []  Ice     []  heat  []  Ice massage  []  Laser   []  Anodyne Position:  Location:    []  Laser with stim  []  Other:  Position:  Location:    []  Vasopneumatic Device Pressure:       [] lo [] med [] hi   Temperature: [] lo [] med [] hi   [] Skin assessment post-treatment:  []intact []redness- no adverse reaction    []redness - adverse reaction:     10 min Therapeutic Exercise:  [x] See flow sheet :   Rationale: increase ROM and increase strength to improve the patients ability to perform ADLs    26 min Neuromuscular Re-education:  [x]  See flow sheet : functional reaching activities Rationale: increase ROM, increase strength, improve coordination, improve balance and increase proprioception  to improve the patients ability to improve mobility, reaching, lifting, and carrying         With   [x] TE   [] TA   [x] neuro   [] other: Patient Education: [x] Review HEP    [] Progressed/Changed HEP based on:   [x] positioning   [x] body mechanics   [] transfers   [] heat/ice application    [] other:      Other Objective/Functional Measures:      Pain Level (0-10 scale) post treatment: 0    ASSESSMENT/Changes in Function: Pt is progressing wonderfully. Improving with pronation and supination with less discomfort reported. Good reaching and mechanics with exercises. ROM and strength continue to show good improvements. Patient will continue to benefit from skilled PT services to modify and progress therapeutic interventions, address functional mobility deficits, address ROM deficits, address strength deficits, analyze and address soft tissue restrictions, analyze and cue movement patterns, analyze and modify body mechanics/ergonomics, assess and modify postural abnormalities, address imbalance/dizziness and instruct in home and community integration to attain remaining goals. [x]  See Plan of Care  []  See progress note/recertification  []  See Discharge Summary         Progress towards goals / Updated goals:  1. Patient will be independent with HEP for ROM & Strengthening. PN status: Reports compliance, will update as we progress   Continued compliance  2. Pt will increase FOTO score to 78 points to demonstrate improved functional lift & carry. PN status:  64   Assess at [place]  3. Pt will increase L elbow flexion/extension strength to grossly 5/5 in order to improve functional lift & carry.    PN status:  4+/5   Continued improvement    PLAN  []  Upgrade activities as tolerated     [x]  Continue plan of care  []  Update interventions per flow sheet       []  Discharge due to:_  []  Other:_ Brennan Barksdale PTA, CSCS 9/18/2017  8:13 AM    Future Appointments  Date Time Provider Caroline Johnson   9/20/2017 7:30 AM [facility]   9/25/2017 7:30 AM [facility]   9/28/2017 7:30 AM [facility]

## 2017-09-20 ENCOUNTER — HOSPITAL ENCOUNTER (OUTPATIENT)
Dept: PHYSICAL THERAPY | Age: 57
Discharge: HOME | End: 2017-09-20
Payer: COMMERCIAL

## 2017-09-20 PROCEDURE — 97112 NEUROMUSCULAR REEDUCATION: CPT

## 2017-09-20 PROCEDURE — 97110 THERAPEUTIC EXERCISES: CPT

## 2017-09-20 NOTE — PROGRESS NOTES
PT DAILY TREATMENT NOTE     Patient Name: August Mena  Date:2017  : 1960  [x]  Patient  Verified  Payor: Lazaro Fleming / Plan: [employer]  [facility] PT / Product Type: Commerical /    In time:732  Out time:806  Total Treatment Time (min): 34  Visit #: 4 of 8    Treatment Area: Left shoulder pain [M25.512]  Left arm pain [M79.602]    SUBJECTIVE  Pain Level (0-10 scale): 0  Any medication changes, allergies to medications, adverse drug reactions, diagnosis change, or new procedure performed?: [x] No    [] Yes (see summary sheet for update)  Subjective functional status/changes:   [] No changes reported  \"No pain. \"    OBJECTIVE    Modality rationale: patient declined   Min Type Additional Details    [] Estim:  []Unatt       []IFC  []Premod                        []Other:  []w/ice   []w/heat  Position:  Location:    [] Estim: []Att    []TENS instruct  []NMES                    []Other:  []w/US   []w/ice   []w/heat  Position:  Location:    []  Traction: [] Cervical       []Lumbar                       [] Prone          []Supine                       []Intermittent   []Continuous Lbs:  [] before manual  [] after manual    []  Ultrasound: []Continuous   [] Pulsed                           []1MHz   []3MHz W/cm2:  Location:    []  Iontophoresis with dexamethasone         Location: [] Take home patch   [] In clinic    []  Ice     []  heat  []  Ice massage  []  Laser   []  Anodyne Position:  Location:    []  Laser with stim  []  Other:  Position:  Location:    []  Vasopneumatic Device Pressure:       [] lo [] med [] hi   Temperature: [] lo [] med [] hi   [] Skin assessment post-treatment:  []intact []redness- no adverse reaction    []redness - adverse reaction:     10 min Therapeutic Exercise:  [x] See flow sheet :   Rationale: increase ROM and increase strength to improve the patients ability to perform ADLs    24 min Neuromuscular Re-education:  [x]  See flow sheet : functional reaching activities Rationale: increase ROM, increase strength, improve coordination, improve balance and increase proprioception  to improve the patients ability to improve mobility, reaching, lifting, and carrying        With   [x] TE   [] TA   [x] neuro   [] other: Patient Education: [x] Review HEP    [] Progressed/Changed HEP based on:   [x] positioning   [x] body mechanics   [] transfers   [] heat/ice application    [] other:      Other Objective/Functional Measures:      Pain Level (0-10 scale) post treatment: 0    ASSESSMENT/Changes in Function: Pt is progressing wonderfully. He had difficulty getting into a staggered wall push up due to tightness with pronation. We will continue to progress per protocol. Patient will continue to benefit from skilled PT services to modify and progress therapeutic interventions, address functional mobility deficits, address ROM deficits, address strength deficits, analyze and address soft tissue restrictions, analyze and cue movement patterns, analyze and modify body mechanics/ergonomics, assess and modify postural abnormalities, address imbalance/dizziness and instruct in home and community integration to attain remaining goals. [x]  See Plan of Care  []  See progress note/recertification  []  See Discharge Summary         Progress towards goals / Updated goals:  1. Patient will be independent with HEP for ROM & Strengthening. PN status: Reports compliance, will update as we progress   Continued compliance  2. Pt will increase FOTO score to 78 points to demonstrate improved functional lift & carry. PN status:  64   Assess at end of POC  3. Pt will increase L elbow flexion/extension strength to grossly 5/5 in order to improve functional lift & carry.    PN status:  4+/5   Continued improvement    PLAN  []  Upgrade activities as tolerated     [x]  Continue plan of care  []  Update interventions per flow sheet       []  Discharge due to:_  []  Other:_      Dominik Carlton, PTA, CSCS 9/20/2017  8:08 AM    Future Appointments  Date Time Provider Caroline Johnson   9/25/2017 7:30 AM [facility]   9/28/2017 7:30 AM [facility] Rosenberg

## 2017-09-25 ENCOUNTER — HOSPITAL ENCOUNTER (OUTPATIENT)
Dept: PHYSICAL THERAPY | Age: 57
Discharge: HOME | End: 2017-09-25
Payer: COMMERCIAL

## 2017-09-25 PROCEDURE — 97110 THERAPEUTIC EXERCISES: CPT

## 2017-09-25 NOTE — PROGRESS NOTES
[facility]  [address], [address] [phone] fax    Physician Update  [x] Progress Note  [] Discharge Summary    Patient name: Vik Diaz Start of Care: 2017   Referral source: Laura Bingham* : 1960                          Medical Diagnosis: Left shoulder pain [M25.512]  Left arm pain [M79.602] Onset Date:17                          Treatment Diagnosis: s/p L distal biceps tendon repair   Prior Hospitalization: see medical history Provider#: [id number]   Medications: Verified on Patient summary List    Comorbidities: none per patient    Prior Level of 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 Functionally independent and active, enjoys working out frequently      Visits from Start of Care: 13    Missed Visits: 0    Progress towards Goals:   1. Patient will be independent with HEP for ROM & Strengthening. PN status: Reports compliance, will update as we progress                        MET: Continued compliance  2. Pt will increase FOTO score to 78 points to demonstrate improved functional lift & carry. PN status:  64                        PROGRESSING WELL: 72  3. Pt will increase L elbow flexion/extension strength to grossly 5/5 in order to improve functional lift & carry. PN status:  4+/5                        MET: 5/5 with MMT      ASSESSMENT/RECOMMENDATIONS:  Mr. Ailin Mckinney has been a pleasure to treat and has seen excellent progress towards ROM & functional strengthening goals. Pain remains well controlled and he has tolerated progression to strengthening activities well. At this point, Mr. Ailin Mckinney is relatively independent in his exercise program and should transition well to updated independent HEP pending MD clearance.     [x]Await Physician's recommendations/decisions regarding therapy    Thank you for this referral.   Dickson Palacio Liat Sierra, PT 9/25/2017 7:43 AM  NOTE TO PHYSICIAN:  PLEASE COMPLETE THE ORDERS BELOW AND   FAX TO [facility]: ([phone]  If you are unable to process this request in 24 hours please contact our office: [phone]    []  I have read the above report and request that my patient continue as recommended. []  I have read the above report and request that my patient continue therapy with the following changes/special instructions:________________________________________  []I have read the above report and request that my patient be discharged from therapy.     Physicians signature: ______________________________Date: _____Time:_______

## 2017-09-25 NOTE — PROGRESS NOTES
PT DAILY TREATMENT NOTE     Patient Name: [facility]  Date:2017  : 1960  [x]  Patient  Verified  Payor: Chadd Alexandra / Plan: [employer]  CAPITATED PT / Product Type: Commerical /    In time:735  Out time:813  Total Treatment Time (min): 38  Visit #: 5 of 8    Treatment Area: Left shoulder pain [M25.512]  Left arm pain [M79.602]    SUBJECTIVE  Pain Level (0-10 scale): 0  Any medication changes, allergies to medications, adverse drug reactions, diagnosis change, or new procedure performed?: [x] No    [] Yes (see summary sheet for update)  Subjective functional status/changes:   [] No changes reported  \"I'm doing great. \"    OBJECTIVE    Modality rationale: PD   Min Type Additional Details    [] Estim:  []Unatt       []IFC  []Premod                        []Other:  []w/ice   []w/heat  Position:  Location:    [] Estim: []Att    []TENS instruct  []NMES                    []Other:  []w/US   []w/ice   []w/heat  Position:  Location:    []  Traction: [] Cervical       []Lumbar                       [] Prone          []Supine                       []Intermittent   []Continuous Lbs:  [] before manual  [] after manual    []  Ultrasound: []Continuous   [] Pulsed                           []1MHz   []3MHz W/cm2:  Location:    []  Iontophoresis with dexamethasone         Location: [] Take home patch   [] In clinic    []  Ice     []  heat  []  Ice massage  []  Laser   []  Anodyne Position:  Location:    []  Laser with stim  []  Other:  Position:  Location:    []  Vasopneumatic Device Pressure:       [] lo [] med [] hi   Temperature: [] lo [] med [] hi   [] Skin assessment post-treatment:  []intact []redness- no adverse reaction    []redness - adverse reaction:       38 min Therapeutic Exercise:  [x] See flow sheet :   Rationale: increase ROM, increase strength, improve coordination, improve balance and increase proprioception to improve the patients ability to reach/lift         With   [] TE   [] TA   [] neuro [] other: Patient Education: [x] Review HEP    [] Progressed/Changed HEP based on:   [] positioning   [] body mechanics   [] transfers   [] heat/ice application    [] other:      Other Objective/Functional Measures:      Pain Level (0-10 scale) post treatment: 0    ASSESSMENT/Changes in Function: see PN    Patient will continue to benefit from skilled PT services to modify and progress therapeutic interventions, address functional mobility deficits, address ROM deficits, address strength deficits, analyze and address soft tissue restrictions, analyze and cue movement patterns, analyze and modify body mechanics/ergonomics, assess and modify postural abnormalities, address imbalance/dizziness and instruct in home and community integration to attain remaining goals. []  See Plan of Care  []  See progress note/recertification  []  See Discharge Summary         Progress towards Goals:   1. Patient will be independent with HEP for ROM & Strengthening.                        JA status: Reports compliance, will update as we progress                        MET: Continued compliance  2. Pt will increase FOTO score to 78 points to demonstrate improved functional lift & carry.                         AQ status:  64                        PROGRESSING WELL: 72  3.  Pt will increase L elbow flexion/extension strength to grossly 5/5 in order to improve functional lift & carry.                        PN status:  4+/5                        MET: 5/5 with MMT       PLAN  [x]  Upgrade activities as tolerated     []  Continue plan of care  []  Update interventions per flow sheet       []  Discharge due to:_  []  Other:_      Annabelle Saavedra, PT 9/25/2017  7:59 AM    Future Appointments  Date Time Provider Caroline Johnson   9/28/2017 7:30 AM Cheri Ewing, AURA Sánchez

## 2017-09-28 ENCOUNTER — HOSPITAL ENCOUNTER (OUTPATIENT)
Dept: PHYSICAL THERAPY | Age: 57
Discharge: HOME | End: 2017-09-28
Payer: COMMERCIAL

## 2017-09-28 PROCEDURE — 97110 THERAPEUTIC EXERCISES: CPT

## 2017-09-28 NOTE — PROGRESS NOTES
[facility] - [place] [id number]  [id number], [phone] fax    Discharge Summary  Patient name: Delonte Mack Start of Care: 2017   Referral source: Christie Sumner* : 1960                          Medical Diagnosis: Left shoulder pain [M25.512]  Left arm pain [M79.602] Onset Date:17                          Treatment Diagnosis: s/p L distal biceps tendon repair   Prior Hospitalization: see medical history Provider#: [id number]   Medications: Verified on Patient summary List    Comorbidities: none per patient    Prior Level of Function: Works as [employer] (). Functionally independent and active, enjoys working out frequently          Visits from Start of Care: 14    Missed Visits: 0    Reporting Period : 17 to 17    Progress towards goals:  1. Patient will be independent with HEP for ROM & Strengthening. PN status: Reports compliance, will update as we progress                        MET: Continued compliance  2. Pt will increase FOTO score to 78 points to demonstrate improved functional lift & carry. PN status:  64                        NOT MET; 72  3. Pt will increase L elbow flexion/extension strength to grossly 5/5 in order to improve functional lift & carry. PN status:  4+/5                        MET: 5/5 with MMT     Functional Gains: ADLs, washing face, lifting within 25# restrictions, driving, mobility  Functional Deficits: none to report  % improvement: 100%  Pain   Average: 0/10                            Best: 0/10                          Worst: 2/10  Patient Goal: \"to get back to normal and working out like I used to. \"       Assessment/Summary of care: Mr. Evaristo Womack has been a pleasure to treat and has seen full return of function over the past 1-2 months.  He has been cleared to return to full duty at work and will discharge from skilled PT with transition to updated HEP.     RECOMMENDATIONS:  [x]Discontinue therapy: [x]Patient has reached or is progressing toward set goals      []Patient is non-compliant or has abdicated      []Due to lack of appreciable progress towards set [address], PT 9/28/2017 8:46 AM

## 2017-09-28 NOTE — PROGRESS NOTES
PT DAILY TREATMENT NOTE     Patient Name: Fran Coles  Date:2017  : 1960  [x]  Patient  Verified  Payor: Cinthya Willett / Plan: [employer]  CAPITATED PT / Product Type: Commerical /    In time:735  Out time:800  Total Treatment Time (min): 25  Visit #: 6 of 8    Treatment Area: Left shoulder pain [M25.512]  Left arm pain [M79.602]    SUBJECTIVE  Pain Level (0-10 scale): 0  Any medication changes, allergies to medications, adverse drug reactions, diagnosis change, or new procedure performed?: [x] No    [] Yes (see summary sheet for update)  Subjective functional status/changes:   [] No changes reported  \"No pain. \"    OBJECTIVE    Modality rationale: patient declined   Min Type Additional Details    [] Estim:  []Unatt       []IFC  []Premod                        []Other:  []w/ice   []w/heat  Position:  Location:    [] Estim: []Att    []TENS instruct  []NMES                    []Other:  []w/US   []w/ice   []w/heat  Position:  Location:    []  Traction: [] Cervical       []Lumbar                       [] Prone          []Supine                       []Intermittent   []Continuous Lbs:  [] before manual  [] after manual    []  Ultrasound: []Continuous   [] Pulsed                           []1MHz   []3MHz W/cm2:  Location:    []  Iontophoresis with dexamethasone         Location: [] Take home patch   [] In clinic    []  Ice     []  heat  []  Ice massage  []  Laser   []  Anodyne Position:  Location:    []  Laser with stim  []  Other:  Position:  Location:    []  Vasopneumatic Device Pressure:       [] lo [] med [] hi   Temperature: [] lo [] med [] hi   [] Skin assessment post-treatment:  []intact []redness- no adverse reaction    []redness - adverse reaction:     25 min Therapeutic Exercise:  [x] See flow sheet :   Rationale: increase ROM and increase strength to improve the patients ability to perform ADLs, reaching, and lifting         With   [x] TE   [] TA   [x] neuro   [] other: Patient Education: [x] Review HEP    [] Progressed/Changed HEP based on:   [x] positioning   [x] body mechanics   [] transfers   [] heat/ice application    [] other:      Other Objective/Functional Measures:   FOTO 72   strength R - 120#   strength L - 100#     Pain Level (0-10 scale) post treatment: 0    ASSESSMENT/Changes in Function: Mr. Marissa Bermudez has been a pleasure to treat and reports 100% improvement since beginning therapy. Pt reports ease with ADLs and lifting within his 25# lifting restriction. He has improved tremendously with his motion and strength since beginning therapy. He reports no functional deficits at this time. We are discharging to an updated HEP at this time. []  See Plan of Care  []  See progress note/recertification  [x]  See Discharge Summary         Progress towards goals / Updated goals:  1. Patient will be independent with HEP for ROM & Strengthening. PN status: Reports compliance, will update as we progress   MET: Continued compliance  2. Pt will increase FOTO score to 78 points to demonstrate improved functional lift & carry. PN status:  64   NOT MET; 72  3. Pt will increase L elbow flexion/extension strength to grossly 5/5 in order to improve functional lift & carry. PN status:  4+/5   MET: 5/5 with MMT    Functional Gains: ADLs, washing face, lifting within 25# restrictions, driving, mobility  Functional Deficits: none to report  % improvement: 100%  Pain   Average: 0/10       Best: 0/10     Worst: 2/10  Patient Goal: \"to get back to normal and working out like I used to. \"    PLAN  []  Upgrade activities as tolerated     []  Continue plan of care  []  Update interventions per flow sheet       [x]  Discharge due to: PROGRAM COMPLETE  []  Other:_      Tea Matt PTA, CSCS 9/28/2017  8:01 AM    No future appointments.